# Patient Record
Sex: FEMALE | Race: WHITE | Employment: UNEMPLOYED | URBAN - METROPOLITAN AREA
[De-identification: names, ages, dates, MRNs, and addresses within clinical notes are randomized per-mention and may not be internally consistent; named-entity substitution may affect disease eponyms.]

---

## 2019-10-30 PROBLEM — M79.7 FIBROMYALGIA: Status: ACTIVE | Noted: 2019-10-30

## 2019-10-30 PROBLEM — D49.7 PITUITARY TUMOR: Status: ACTIVE | Noted: 2019-10-30

## 2019-10-31 PROBLEM — F40.10 SOCIAL ANXIETY DISORDER: Status: ACTIVE | Noted: 2019-10-31

## 2019-10-31 PROBLEM — F39 EPISODIC MOOD DISORDER: Status: ACTIVE | Noted: 2019-10-30

## 2019-10-31 PROBLEM — F43.10 POST TRAUMATIC STRESS DISORDER (PTSD): Status: ACTIVE | Noted: 2019-10-30

## 2019-10-31 PROBLEM — F39 EPISODIC MOOD DISORDER (HCC): Status: ACTIVE | Noted: 2019-10-30

## 2019-11-11 PROBLEM — F41.9 ANXIETY DISORDER, UNSPECIFIED: Status: ACTIVE | Noted: 2019-10-30

## 2020-01-20 PROBLEM — F31.9 BIPOLAR DISORDER (HCC): Status: ACTIVE | Noted: 2020-01-20

## 2021-01-21 PROBLEM — F41.9 ANXIETY DISORDER, UNSPECIFIED: Chronic | Status: ACTIVE | Noted: 2019-10-30

## 2021-01-21 PROBLEM — F31.9 BIPOLAR DISORDER (HCC): Chronic | Status: ACTIVE | Noted: 2020-01-20

## 2021-01-21 PROBLEM — F40.10 SOCIAL ANXIETY DISORDER: Chronic | Status: ACTIVE | Noted: 2019-10-31

## 2021-01-21 PROBLEM — F43.10 POST TRAUMATIC STRESS DISORDER (PTSD): Chronic | Status: ACTIVE | Noted: 2019-10-30

## 2023-05-31 ENCOUNTER — HOSPITAL ENCOUNTER (OUTPATIENT)
Age: 38
Discharge: HOME | End: 2023-05-31
Payer: COMMERCIAL

## 2023-05-31 ENCOUNTER — APPOINTMENT (OUTPATIENT)
Dept: GENERAL RADIOLOGY | Age: 38
End: 2023-05-31
Attending: PHYSICIAN ASSISTANT
Payer: COMMERCIAL

## 2023-05-31 VITALS
BODY MASS INDEX: 25.61 KG/M2 | OXYGEN SATURATION: 97 % | RESPIRATION RATE: 18 BRPM | TEMPERATURE: 98 F | DIASTOLIC BLOOD PRESSURE: 67 MMHG | HEIGHT: 64 IN | HEART RATE: 99 BPM | SYSTOLIC BLOOD PRESSURE: 106 MMHG | WEIGHT: 150 LBS

## 2023-05-31 DIAGNOSIS — S39.012A BACK STRAIN, INITIAL ENCOUNTER: ICD-10-CM

## 2023-05-31 DIAGNOSIS — M54.6 ACUTE LEFT-SIDED THORACIC BACK PAIN: Primary | ICD-10-CM

## 2023-05-31 PROCEDURE — 99213 OFFICE O/P EST LOW 20 MIN: CPT | Performed by: PHYSICIAN ASSISTANT

## 2023-05-31 PROCEDURE — 96372 THER/PROPH/DIAG INJ SC/IM: CPT | Performed by: PHYSICIAN ASSISTANT

## 2023-05-31 PROCEDURE — 71101 X-RAY EXAM UNILAT RIBS/CHEST: CPT | Performed by: PHYSICIAN ASSISTANT

## 2023-05-31 RX ORDER — NAPROXEN 500 MG/1
500 TABLET ORAL 2 TIMES DAILY WITH MEALS
Qty: 20 TABLET | Refills: 0 | Status: SHIPPED | OUTPATIENT
Start: 2023-05-31 | End: 2023-06-07

## 2023-05-31 RX ORDER — KETOROLAC TROMETHAMINE 30 MG/ML
30 INJECTION, SOLUTION INTRAMUSCULAR; INTRAVENOUS ONCE
Status: COMPLETED | OUTPATIENT
Start: 2023-05-31 | End: 2023-05-31

## 2023-05-31 RX ORDER — CYCLOBENZAPRINE HCL 5 MG
TABLET ORAL 3 TIMES DAILY PRN
Qty: 20 TABLET | Refills: 0 | Status: SHIPPED | OUTPATIENT
Start: 2023-05-31
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
## 2023-05-31 NOTE — DISCHARGE INSTRUCTIONS
Please return to the Emergency department/clinic if symptoms worsen or you develop new symptoms. Follow up with your primary care physician in 2 days. Take any medications prescribed to you as instructed. You may take 500 mg of naproxen every 12 hours with food. Do this routinely for the next 3-5 days. You may then use it as needed for pain/swelling. If you develop severe abdominal pain or dark, tarry stool, stop this medication immediately and seek medical care. Muscle relaxers can cause drowsiness. Do not drive or operate machinery while taking this medication. You should not take narcotic pain medication and muscle relaxers at the same time, as they both can cause extreme drowsiness.

## 2025-07-18 ENCOUNTER — HOSPITAL ENCOUNTER (OUTPATIENT)
Age: 40
Discharge: HOME | End: 2025-07-18
Payer: COMMERCIAL

## 2025-07-18 ENCOUNTER — APPOINTMENT (OUTPATIENT)
Dept: GENERAL RADIOLOGY | Age: 40
End: 2025-07-18
Attending: PHYSICIAN ASSISTANT
Payer: COMMERCIAL

## 2025-07-18 VITALS
SYSTOLIC BLOOD PRESSURE: 124 MMHG | OXYGEN SATURATION: 97 % | TEMPERATURE: 99 F | BODY MASS INDEX: 24.96 KG/M2 | WEIGHT: 148 LBS | HEIGHT: 64.5 IN | HEART RATE: 92 BPM | DIASTOLIC BLOOD PRESSURE: 84 MMHG | RESPIRATION RATE: 16 BRPM

## 2025-07-18 DIAGNOSIS — S20.212A CONTUSION OF RIB ON LEFT SIDE, INITIAL ENCOUNTER: Primary | ICD-10-CM

## 2025-07-18 DIAGNOSIS — M62.838 TRAPEZIUS MUSCLE SPASM: ICD-10-CM
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
## 2025-07-18 DIAGNOSIS — S29.9XXA TRAUMATIC INJURY OF RIB: ICD-10-CM

## 2025-07-18 PROCEDURE — 71101 X-RAY EXAM UNILAT RIBS/CHEST: CPT | Performed by: PHYSICIAN ASSISTANT

## 2025-07-18 PROCEDURE — 99214 OFFICE O/P EST MOD 30 MIN: CPT | Performed by: PHYSICIAN ASSISTANT

## 2025-07-18 RX ORDER — PREGABALIN 50 MG/1
50 CAPSULE ORAL 2 TIMES DAILY
COMMUNITY
Start: 2025-07-17 | End: 2025-08-16

## 2025-07-18 RX ORDER — METHYLPHENIDATE HYDROCHLORIDE 10 MG/1
10 TABLET ORAL 2 TIMES DAILY
COMMUNITY
Start: 2025-07-17 | End: 2025-08-16

## 2025-07-18 RX ORDER — CYCLOBENZAPRINE HCL 10 MG
10 TABLET ORAL NIGHTLY PRN
Qty: 10 TABLET | Refills: 0 | Status: SHIPPED | OUTPATIENT
Start: 2025-07-18 | End: 2025-07-25

## 2025-07-18 NOTE — DISCHARGE INSTRUCTIONS
Continue increase fluid and rest  Ibuprofen Tylenol use for pain  May use muscle relaxants this will make you drowsy do not drive or operate heavy machinery  Close follow-up with your primary care doctor  Return to ER symptoms worsen
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
